# Patient Record
Sex: FEMALE | Race: WHITE | Employment: FULL TIME | URBAN - METROPOLITAN AREA
[De-identification: names, ages, dates, MRNs, and addresses within clinical notes are randomized per-mention and may not be internally consistent; named-entity substitution may affect disease eponyms.]

---

## 2017-12-23 ENCOUNTER — OFFICE VISIT (OUTPATIENT)
Dept: FAMILY MEDICINE CLINIC | Facility: CLINIC | Age: 27
End: 2017-12-23

## 2017-12-23 VITALS
DIASTOLIC BLOOD PRESSURE: 70 MMHG | SYSTOLIC BLOOD PRESSURE: 116 MMHG | WEIGHT: 135 LBS | HEART RATE: 84 BPM | TEMPERATURE: 98 F | RESPIRATION RATE: 14 BRPM | BODY MASS INDEX: 19 KG/M2

## 2017-12-23 DIAGNOSIS — R05.8 PRODUCTIVE COUGH: ICD-10-CM

## 2017-12-23 DIAGNOSIS — J31.0 PURULENT RHINITIS: Primary | ICD-10-CM

## 2017-12-23 PROCEDURE — 99214 OFFICE O/P EST MOD 30 MIN: CPT | Performed by: FAMILY MEDICINE

## 2017-12-23 RX ORDER — AMOXICILLIN AND CLAVULANATE POTASSIUM 500; 125 MG/1; MG/1
TABLET, FILM COATED ORAL
Qty: 20 TABLET | Refills: 0 | Status: SHIPPED | OUTPATIENT
Start: 2017-12-23 | End: 2018-01-02

## 2017-12-23 NOTE — PROGRESS NOTES
Zee Bronson is a 24year old female. CC:  Patient presents with:  Nasal Congestion: cough per pt      HPI:  The patient has primary complaint of facial pain   and nasal congestion for  4 days. Associated symptoms include productive cough.  The pa lymphadenopathy, thyromegaly or masses  CAR: S1, S2 normal, RRR; no S3, no S4; no click; murmur negative  PULM: clear to auscultation B, no accessory muscle use  GI: not examined  PSYCH: alert and oriented x 3; affect appropriate  SKIN: not examined  JENIFFER

## 2018-05-23 ENCOUNTER — OFFICE VISIT (OUTPATIENT)
Dept: FAMILY MEDICINE CLINIC | Facility: CLINIC | Age: 28
End: 2018-05-23

## 2018-05-23 VITALS
HEIGHT: 69 IN | WEIGHT: 137.81 LBS | TEMPERATURE: 98 F | SYSTOLIC BLOOD PRESSURE: 110 MMHG | DIASTOLIC BLOOD PRESSURE: 80 MMHG | BODY MASS INDEX: 20.41 KG/M2 | RESPIRATION RATE: 12 BRPM | HEART RATE: 80 BPM

## 2018-05-23 DIAGNOSIS — J45.20 MILD INTERMITTENT REACTIVE AIRWAY DISEASE WITHOUT COMPLICATION: ICD-10-CM

## 2018-05-23 DIAGNOSIS — J30.9 ALLERGIC RHINITIS, UNSPECIFIED SEASONALITY, UNSPECIFIED TRIGGER: Primary | ICD-10-CM

## 2018-05-23 PROCEDURE — 99214 OFFICE O/P EST MOD 30 MIN: CPT | Performed by: FAMILY MEDICINE

## 2018-05-23 RX ORDER — ALBUTEROL SULFATE 90 UG/1
2 AEROSOL, METERED RESPIRATORY (INHALATION) EVERY 4 HOURS PRN
Qty: 1 INHALER | Refills: 5 | Status: SHIPPED | OUTPATIENT
Start: 2018-05-23 | End: 2020-04-01

## 2018-05-23 RX ORDER — MONTELUKAST SODIUM 10 MG/1
10 TABLET ORAL NIGHTLY
Qty: 90 TABLET | Refills: 2 | Status: SHIPPED | OUTPATIENT

## 2018-05-23 NOTE — PROGRESS NOTES
Megan Sandoval is a 24year old female. CC:  Patient presents with: Allergies      HPI:  For a few years she has noted nasal congestion, cough, SOB and wheezing when exposed to pollens and dust for prolonged periods of time.  Left over Albuterol fr no click; murmur negative  PULM: clear to auscultation B, no accessory muscle use  GI: not examined  PSYCH: alert and oriented x 3; affect appropriate  SKIN: not examined  BREAST: not examined/not applicable  EXTREMITIES: No clubbing, cyanosis or edema  RE

## 2018-06-13 ENCOUNTER — OFFICE VISIT (OUTPATIENT)
Dept: FAMILY MEDICINE CLINIC | Facility: CLINIC | Age: 28
End: 2018-06-13

## 2018-06-13 VITALS
SYSTOLIC BLOOD PRESSURE: 102 MMHG | BODY MASS INDEX: 20 KG/M2 | RESPIRATION RATE: 14 BRPM | TEMPERATURE: 99 F | WEIGHT: 137.38 LBS | DIASTOLIC BLOOD PRESSURE: 64 MMHG | HEART RATE: 92 BPM

## 2018-06-13 DIAGNOSIS — J45.20 MILD INTERMITTENT REACTIVE AIRWAY DISEASE WITHOUT COMPLICATION: ICD-10-CM

## 2018-06-13 DIAGNOSIS — J30.9 ALLERGIC RHINITIS, UNSPECIFIED SEASONALITY, UNSPECIFIED TRIGGER: Primary | ICD-10-CM

## 2018-06-13 PROCEDURE — 99213 OFFICE O/P EST LOW 20 MIN: CPT | Performed by: FAMILY MEDICINE

## 2018-06-13 NOTE — PROGRESS NOTES
Will Anderson is a 34year old female. CC:  Patient presents with: Follow - Up: per pt, follow up on allergies and asthma      HPI:  F/u allergic rhinitis and reactive airway. She is doing much better with the Singulair.  She has only had to use t well developed, well nourished, in no apparent distress  EYE: B conjunctiva and lids normal  HENT: normocephalic; normal nose, pharynx and TM's  NECK: No lymphadenopathy, thyromegaly or masses  CAR: S1, S2 normal, RRR; no S3, no S4; no click; murmur negati

## 2018-06-29 ENCOUNTER — OFFICE VISIT (OUTPATIENT)
Dept: FAMILY MEDICINE CLINIC | Facility: CLINIC | Age: 28
End: 2018-06-29

## 2018-06-29 VITALS
DIASTOLIC BLOOD PRESSURE: 88 MMHG | HEIGHT: 69 IN | TEMPERATURE: 98 F | BODY MASS INDEX: 20.56 KG/M2 | WEIGHT: 138.81 LBS | HEART RATE: 88 BPM | SYSTOLIC BLOOD PRESSURE: 116 MMHG | RESPIRATION RATE: 18 BRPM

## 2018-06-29 DIAGNOSIS — R09.81 NASAL CONGESTION: ICD-10-CM

## 2018-06-29 DIAGNOSIS — J31.0 PURULENT RHINITIS: Primary | ICD-10-CM

## 2018-06-29 DIAGNOSIS — R05.8 PRODUCTIVE COUGH: ICD-10-CM

## 2018-06-29 PROCEDURE — 99214 OFFICE O/P EST MOD 30 MIN: CPT | Performed by: FAMILY MEDICINE

## 2018-06-29 RX ORDER — AMOXICILLIN AND CLAVULANATE POTASSIUM 500; 125 MG/1; MG/1
TABLET, FILM COATED ORAL
Qty: 20 TABLET | Refills: 0 | Status: SHIPPED | OUTPATIENT
Start: 2018-06-29 | End: 2018-07-09

## 2018-06-29 NOTE — PROGRESS NOTES
Hadley Mishra is a 34year old female. CC:  Patient presents with:  Sinus Problem: per pt   Cough      HPI:  The patient has primary complaint of nasal congestion and productive cough for  1 week. Associated symptoms include fever.  The patient has Resp 18   Ht 69\"   Wt 138 lb 12.8 oz   LMP 06/28/2018   BMI 20.50 kg/m²    Reviewed by Stefanie Bolivar M.D.     Physical Exam:  GEN: well developed, well nourished, in no apparent distress  EYE: B conjunctiva and lids normal  HENT: Nasal mucosa is boggy with d

## 2018-11-07 ENCOUNTER — OFFICE VISIT (OUTPATIENT)
Dept: FAMILY MEDICINE CLINIC | Facility: CLINIC | Age: 28
End: 2018-11-07
Payer: COMMERCIAL

## 2018-11-07 VITALS
RESPIRATION RATE: 12 BRPM | BODY MASS INDEX: 20.98 KG/M2 | HEART RATE: 94 BPM | WEIGHT: 141.63 LBS | HEIGHT: 69 IN | SYSTOLIC BLOOD PRESSURE: 120 MMHG | TEMPERATURE: 100 F | DIASTOLIC BLOOD PRESSURE: 70 MMHG

## 2018-11-07 DIAGNOSIS — R09.81 NASAL CONGESTION: ICD-10-CM

## 2018-11-07 DIAGNOSIS — J31.0 PURULENT RHINITIS: Primary | ICD-10-CM

## 2018-11-07 PROCEDURE — 99214 OFFICE O/P EST MOD 30 MIN: CPT | Performed by: FAMILY MEDICINE

## 2018-11-07 RX ORDER — CEFDINIR 300 MG/1
300 CAPSULE ORAL 2 TIMES DAILY
Qty: 20 CAPSULE | Refills: 0 | Status: SHIPPED | OUTPATIENT
Start: 2018-11-07 | End: 2018-11-17

## 2018-11-07 NOTE — PROGRESS NOTES
Lina Barth is a 34year old female. CC:  Patient presents with:  Sinus Problem: per pt      HPI:  The patient has primary complaint of nasal congestion and rhinorrhea that is thick and colored for  10 days.  Associated symptoms include facial pa Resp 12   Ht 69\"   Wt 141 lb 9.6 oz   LMP 10/17/2018   BMI 20.91 kg/m²    Reviewed by Ar Jose M.D.     Physical Exam:  GEN: well developed, well nourished, in no apparent distress  EYE: B conjunctiva and lids normal  HENT: Nasal mucosa is boggy with di

## 2019-02-06 PROCEDURE — 87624 HPV HI-RISK TYP POOLED RSLT: CPT | Performed by: OBSTETRICS & GYNECOLOGY

## 2019-02-06 PROCEDURE — 88175 CYTOPATH C/V AUTO FLUID REDO: CPT | Performed by: OBSTETRICS & GYNECOLOGY

## 2019-04-04 ENCOUNTER — OFFICE VISIT (OUTPATIENT)
Dept: FAMILY MEDICINE | Age: 29
End: 2019-04-04

## 2019-04-04 VITALS
HEART RATE: 88 BPM | TEMPERATURE: 98.1 F | DIASTOLIC BLOOD PRESSURE: 80 MMHG | BODY MASS INDEX: 21.05 KG/M2 | HEIGHT: 70 IN | SYSTOLIC BLOOD PRESSURE: 116 MMHG | RESPIRATION RATE: 16 BRPM | WEIGHT: 147 LBS

## 2019-04-04 DIAGNOSIS — J45.20 MILD INTERMITTENT ASTHMA WITHOUT COMPLICATION: ICD-10-CM

## 2019-04-04 DIAGNOSIS — B96.89 ACUTE BACTERIAL SINUSITIS: Primary | ICD-10-CM

## 2019-04-04 DIAGNOSIS — J01.90 ACUTE BACTERIAL SINUSITIS: Primary | ICD-10-CM

## 2019-04-04 PROCEDURE — 99204 OFFICE O/P NEW MOD 45 MIN: CPT | Performed by: FAMILY MEDICINE

## 2019-04-04 RX ORDER — MONTELUKAST SODIUM 10 MG/1
10 TABLET ORAL EVERY EVENING
Qty: 90 TABLET | Refills: 3 | Status: SHIPPED | OUTPATIENT
Start: 2019-04-04

## 2019-04-04 RX ORDER — CEFDINIR 300 MG/1
300 CAPSULE ORAL 2 TIMES DAILY
Qty: 14 CAPSULE | Refills: 0 | Status: SHIPPED | OUTPATIENT
Start: 2019-04-04 | End: 2019-04-11

## 2019-04-04 SDOH — HEALTH STABILITY: MENTAL HEALTH: HOW OFTEN DO YOU HAVE A DRINK CONTAINING ALCOHOL?: NEVER

## 2019-04-04 ASSESSMENT — ENCOUNTER SYMPTOMS
NAUSEA: 0
HEADACHES: 0
CHOKING: 0
SEIZURES: 0
ABDOMINAL DISTENTION: 0
FEVER: 0
APNEA: 0
WEAKNESS: 0
NUMBNESS: 0
SPEECH DIFFICULTY: 0
UNEXPECTED WEIGHT CHANGE: 0
ADENOPATHY: 0
FATIGUE: 0
EYE DISCHARGE: 0
WHEEZING: 0
SHORTNESS OF BREATH: 0
SINUS PAIN: 1
SINUS PRESSURE: 1
TREMORS: 0
BRUISES/BLEEDS EASILY: 0
EYE REDNESS: 0
POLYDIPSIA: 0
RECTAL PAIN: 0
CONSTIPATION: 0
CHEST TIGHTNESS: 0
CHILLS: 0
ABDOMINAL PAIN: 0
CONFUSION: 0
POLYPHAGIA: 0
BLOOD IN STOOL: 0
APPETITE CHANGE: 0
ACTIVITY CHANGE: 0
DIARRHEA: 0
EYE PAIN: 0
COUGH: 0
DIZZINESS: 0
TROUBLE SWALLOWING: 0
RHINORRHEA: 1
SORE THROAT: 1

## 2019-08-06 ENCOUNTER — TELEPHONE (OUTPATIENT)
Dept: FAMILY MEDICINE | Age: 29
End: 2019-08-06

## 2019-09-23 ENCOUNTER — TELEPHONE (OUTPATIENT)
Dept: FAMILY MEDICINE | Age: 29
End: 2019-09-23

## 2019-09-24 ENCOUNTER — OFFICE VISIT (OUTPATIENT)
Dept: FAMILY MEDICINE CLINIC | Facility: CLINIC | Age: 29
End: 2019-09-24
Payer: COMMERCIAL

## 2019-09-24 VITALS
TEMPERATURE: 99 F | WEIGHT: 148.63 LBS | BODY MASS INDEX: 21.28 KG/M2 | SYSTOLIC BLOOD PRESSURE: 110 MMHG | HEIGHT: 70 IN | RESPIRATION RATE: 18 BRPM | OXYGEN SATURATION: 99 % | HEART RATE: 96 BPM | DIASTOLIC BLOOD PRESSURE: 76 MMHG

## 2019-09-24 DIAGNOSIS — H65.03 NON-RECURRENT ACUTE SEROUS OTITIS MEDIA OF BOTH EARS: ICD-10-CM

## 2019-09-24 DIAGNOSIS — R09.81 NASAL CONGESTION: ICD-10-CM

## 2019-09-24 DIAGNOSIS — M89.9 SCAPULAR DYSFUNCTION: ICD-10-CM

## 2019-09-24 DIAGNOSIS — R09.81 SINUS CONGESTION: Primary | ICD-10-CM

## 2019-09-24 PROCEDURE — 99214 OFFICE O/P EST MOD 30 MIN: CPT | Performed by: FAMILY MEDICINE

## 2019-09-24 RX ORDER — AMOXICILLIN AND CLAVULANATE POTASSIUM 500; 125 MG/1; MG/1
TABLET, FILM COATED ORAL
Qty: 20 TABLET | Refills: 0 | Status: SHIPPED | OUTPATIENT
Start: 2019-09-24 | End: 2019-10-04

## 2019-09-24 NOTE — PROGRESS NOTES
Adin Klinefelter is a 34year old female.     CC:  Patient presents with:  Nasal Congestion: per pt- since sunday  Post Nasal Drip      HPI:  The patient has primary complaint of bilateral ear congestion, facial pain in the maxillary areas and nasal jhony socially( 3 drinks)     Drug use: No       ROS:  General: appetite stable  GI: Denies abdominal pain    Vitals: /76   Pulse 96   Temp 98.5 °F (36.9 °C) (Temporal)   Resp 18   Ht 70\"   Wt 148 lb 9.6 oz   LMP 09/18/2019   SpO2 99%   BMI 21.32 kg/m²

## 2020-04-01 RX ORDER — ALBUTEROL SULFATE 90 UG/1
2 AEROSOL, METERED RESPIRATORY (INHALATION) EVERY 4 HOURS PRN
Qty: 1 INHALER | Refills: 2 | Status: SHIPPED | OUTPATIENT
Start: 2020-04-01 | End: 2020-04-01

## 2020-04-01 RX ORDER — ALBUTEROL SULFATE 90 UG/1
2 AEROSOL, METERED RESPIRATORY (INHALATION) EVERY 4 HOURS PRN
Qty: 1 INHALER | Refills: 2 | Status: SHIPPED | OUTPATIENT
Start: 2020-04-01

## 2020-04-01 NOTE — TELEPHONE ENCOUNTER
Medication Quantity Refills Start End   Albuterol Sulfate HFA (PROAIR HFA) 108 (90 Base) MCG/ACT Inhalation Aero Soln 1 Inhaler 5 5/23/2018      Last OV 9/24/19 for sinus congestion  No AAP on chart. No future appointments.       Asthma & COPD Medication

## 2020-04-01 NOTE — TELEPHONE ENCOUNTER
Albuterol Sulfate HFA (PROAIR HFA) 108 (90 Base) MCG/ACT Inhalation Aero Soln  Please send to [id number] in [place], [place]  Phone number is [phone]

## 2021-09-25 ENCOUNTER — PATIENT MESSAGE (OUTPATIENT)
Dept: FAMILY MEDICINE CLINIC | Facility: CLINIC | Age: 31
End: 2021-09-25

## 2021-09-27 NOTE — TELEPHONE ENCOUNTER
From: Theodore Macedo  To: Kathy Ballesteros MD  Sent: 9/25/2021 3:26 PM CDT  Subject: Sinus infection     Hi Dr. Chikis Rodriguez,     I tried to call today but it was after hours.  I was wondering if there was any way you could call in a prescription for a sinus in

## 2023-03-08 ENCOUNTER — PATIENT OUTREACH (OUTPATIENT)
Dept: CASE MANAGEMENT | Age: 33
End: 2023-03-08

## 2023-03-08 NOTE — PROCEDURES
The office order for PCP removal request is Approved and finalized on March 8, 2023.     Thanks,  [facility] [employer]

## 2024-09-17 NOTE — TELEPHONE ENCOUNTER
From: Anatoly Ram  To: Balwinder Hadley  Sent: 9/17/2024 12:24 PM CDT  Subject: Refills    Hi,     I’m coming up on a medication refill but I’m out of refills. Not sure if you needed to see me or if you can just refill it. Let me know.     Thanks,  Anatoly

## 2024-10-07 NOTE — TELEPHONE ENCOUNTER
From: Anatoly Ram  To: Balwinder Hadley  Sent: 10/6/2024 7:28 PM CDT  Subject: Allergies & Asthma    Hi,     I know you mentioned during our virtual visit to let you know if I needed more help with my allergies & asthma. This past week & weekend has been rough for them. I have been sneezing, had watery eyes, runny nose, sore/scratchy throat, and a hard time with my breathing. I’m not sure if there is something stronger than the singular and inhaler that would help but wanted to get your opinion on it.     Thanks ,  Anatoly